# Patient Record
Sex: MALE | Race: WHITE | Employment: OTHER | URBAN - METROPOLITAN AREA
[De-identification: names, ages, dates, MRNs, and addresses within clinical notes are randomized per-mention and may not be internally consistent; named-entity substitution may affect disease eponyms.]

---

## 2018-02-23 ENCOUNTER — HOSPITAL ENCOUNTER (EMERGENCY)
Facility: CLINIC | Age: 63
Discharge: HOME | End: 2018-02-24
Attending: EMERGENCY MEDICINE | Admitting: EMERGENCY MEDICINE
Payer: COMMERCIAL

## 2018-02-23 DIAGNOSIS — M79.671 RIGHT FOOT PAIN: ICD-10-CM

## 2018-02-23 LAB
ALBUMIN SERPL-MCNC: 3.8 G/DL (ref 3.4–5)
ALP SERPL-CCNC: 54 U/L (ref 40–150)
ALT SERPL W P-5'-P-CCNC: 53 U/L (ref 0–70)
ANION GAP SERPL CALCULATED.3IONS-SCNC: 8 MMOL/L (ref 3–14)
AST SERPL W P-5'-P-CCNC: 29 U/L (ref 0–45)
BASOPHILS # BLD AUTO: 0 10E9/L (ref 0–0.2)
BASOPHILS NFR BLD AUTO: 0.2 %
BILIRUB SERPL-MCNC: 0.7 MG/DL (ref 0.2–1.3)
BUN SERPL-MCNC: 24 MG/DL (ref 7–30)
CALCIUM SERPL-MCNC: 8.8 MG/DL (ref 8.5–10.1)
CHLORIDE SERPL-SCNC: 106 MMOL/L (ref 94–109)
CO2 SERPL-SCNC: 24 MMOL/L (ref 20–32)
CREAT SERPL-MCNC: 1.21 MG/DL (ref 0.66–1.25)
DIFFERENTIAL METHOD BLD: ABNORMAL
EOSINOPHIL # BLD AUTO: 0.1 10E9/L (ref 0–0.7)
EOSINOPHIL NFR BLD AUTO: 0.7 %
ERYTHROCYTE [DISTWIDTH] IN BLOOD BY AUTOMATED COUNT: 13 % (ref 10–15)
GFR SERPL CREATININE-BSD FRML MDRD: 61 ML/MIN/1.7M2
GLUCOSE SERPL-MCNC: 117 MG/DL (ref 70–99)
HCT VFR BLD AUTO: 43.6 % (ref 40–53)
HGB BLD-MCNC: 15.1 G/DL (ref 13.3–17.7)
IMM GRANULOCYTES # BLD: 0 10E9/L (ref 0–0.4)
IMM GRANULOCYTES NFR BLD: 0.2 %
LYMPHOCYTES # BLD AUTO: 2.9 10E9/L (ref 0.8–5.3)
LYMPHOCYTES NFR BLD AUTO: 24.2 %
MCH RBC QN AUTO: 30.7 PG (ref 26.5–33)
MCHC RBC AUTO-ENTMCNC: 34.6 G/DL (ref 31.5–36.5)
MCV RBC AUTO: 89 FL (ref 78–100)
MONOCYTES # BLD AUTO: 1 10E9/L (ref 0–1.3)
MONOCYTES NFR BLD AUTO: 8.3 %
NEUTROPHILS # BLD AUTO: 8 10E9/L (ref 1.6–8.3)
NEUTROPHILS NFR BLD AUTO: 66.4 %
NRBC # BLD AUTO: 0 10*3/UL
NRBC BLD AUTO-RTO: 0 /100
PLATELET # BLD AUTO: 229 10E9/L (ref 150–450)
POTASSIUM SERPL-SCNC: 4 MMOL/L (ref 3.4–5.3)
PROT SERPL-MCNC: 7.5 G/DL (ref 6.8–8.8)
RBC # BLD AUTO: 4.92 10E12/L (ref 4.4–5.9)
SODIUM SERPL-SCNC: 138 MMOL/L (ref 133–144)
WBC # BLD AUTO: 12.1 10E9/L (ref 4–11)

## 2018-02-23 PROCEDURE — 20605 DRAIN/INJ JOINT/BURSA W/O US: CPT | Mod: RT

## 2018-02-23 PROCEDURE — 96374 THER/PROPH/DIAG INJ IV PUSH: CPT

## 2018-02-23 PROCEDURE — 80053 COMPREHEN METABOLIC PANEL: CPT | Performed by: EMERGENCY MEDICINE

## 2018-02-23 PROCEDURE — 85025 COMPLETE CBC W/AUTO DIFF WBC: CPT | Performed by: EMERGENCY MEDICINE

## 2018-02-23 PROCEDURE — 25000128 H RX IP 250 OP 636: Performed by: EMERGENCY MEDICINE

## 2018-02-23 PROCEDURE — 99285 EMERGENCY DEPT VISIT HI MDM: CPT | Mod: 25

## 2018-02-23 RX ORDER — HYDROMORPHONE HYDROCHLORIDE 1 MG/ML
0.5 INJECTION, SOLUTION INTRAMUSCULAR; INTRAVENOUS; SUBCUTANEOUS
Status: DISCONTINUED | OUTPATIENT
Start: 2018-02-23 | End: 2018-02-24 | Stop reason: HOSPADM

## 2018-02-23 RX ADMIN — HYDROMORPHONE HYDROCHLORIDE 0.5 MG: 1 INJECTION, SOLUTION INTRAMUSCULAR; INTRAVENOUS; SUBCUTANEOUS at 22:49

## 2018-02-23 NOTE — ED AVS SNAPSHOT
Emergency Department    [address] [place] [address]    Phone:  [phone]    Fax:  [phone]                                       [facility]   MRN: [id number]    Department:   Emergency Department   Date of Visit:  2/23/2018           After Visit Summary Signature Page     I have received my discharge instructions, and my questions have been answered. I have discussed any challenges I see with this plan with the nurse or doctor.    ..........................................................................................................................................  Patient/Patient Representative Signature      ..........................................................................................................................................  Patient Representative Print Name and Relationship to Patient    ..................................................               ................................................  Date                                            Time    ..........................................................................................................................................  Reviewed by Signature/Title    ...................................................              ..............................................  Date                                                            Time

## 2018-02-23 NOTE — LETTER
February 24, 2018      To Whom It May Concern:      Diony Murillo was seen in our Emergency Department today, 02/24/18.  I expect his condition to improve over the next *** days.  He may return to work/school when improved.    Sincerely,        Salvador Ignacio RN

## 2018-02-23 NOTE — ED AVS SNAPSHOT
Emergency Department    [address] [facility] [address]    Phone:  [phone]    Fax:  [phone]                                       Diony Murillo   MRN: [id number]    Department:   Emergency Department   Date of Visit:  2/23/2018           Patient Information     Date Of Birth          1955        Your diagnoses for this visit were:     Right foot pain        You were seen by Blue Portillo MD.      Follow-up Information     Follow up with Washington Ocasio MD. Call in 1 day.    Specialty:  Surgery    Contact information:    [facility]  [address] [facility] [place] [address]  [phone]          Follow up with  Emergency Department Today.    Specialty:  EMERGENCY MEDICINE    Why:  If symptoms worsen    Contact information:    [address] [facility] [id number] [phone]        Discharge Instructions           Acute Pain, Uncertain Cause  Pain can be caused by many conditions that range from very minor to very serious. In some cases, though, pain comes and goes with no apparent cause.  We were not able to find the exact cause for your pain. At this time there is no sign of any serious illness causing your pain. More tests may be needed to determine the cause. In many cases, pain like this goes away by itself.  Home care  Take any medicines as prescribed. If another medicine was not prescribed for pain, you can take an over-the-counter pain medicine such as ibuprofen or acetaminophen. Use these as directed on the label.    Follow-up care  Follow up with your healthcare provider or our staff as directed.  When to seek medical advice  Call your healthcare provider for any of the following:    Pain changes in pattern    Pain doesn't lessen or gets worse    New symptoms appear    Fever of 100.4 F (38 C) or higher, or as directed by your healthcare provider  Date Last Reviewed: 7/26/2015 2000-2017 [employer]. [address] [facility],  [place] [address]. All rights reserved. This information is not intended as a substitute for professional medical care. Always follow your healthcare professional's instructions.            24 Hour Appointment Hotline       To make an appointment at any [facility], call [phone] ([phone]). If you don't have a family doctor or clinic, we will help you find one. [facility] clinics are conveniently located to serve the needs of you and your family.             Review of your medicines      Our records show that you are taking the medicines listed below. If these are incorrect, please call your family doctor or clinic.        Dose / Directions Last dose taken    COLCHICINE PO   Dose:  0.6 mg        Take 0.6 mg by mouth   Refills:  0                Procedures and tests performed during your visit     CBC with platelets + differential    Cell count with differential fluid    Comprehensive metabolic panel    Crystal ID joint fluid    Fluid Culture Aerobic Bacterial    Glucose fluid    Gram stain    POC US GUIDANCE NEEDLE PLACEMENT    Protein fluid      Orders Needing Specimen Collection     None      Pending Results     Date and Time Order Name Status Description    2/24/2018 0007 POC US GUIDANCE NEEDLE PLACEMENT In process     2/23/2018 2231 Fluid Culture Aerobic Bacterial In process     2/23/2018 2231 Gram stain In process             Pending Culture Results     Date and Time Order Name Status Description    2/23/2018 2231 Fluid Culture Aerobic Bacterial In process     2/23/2018 2231 Gram stain In process             Pending Results Instructions     If you had any lab results that were not finalized at the time of your Discharge, you can call the ED Lab Result RN at [phone]. You will be contacted by this team for any positive Lab results or changes in treatment. The nurses are available 7 days a week from 10A to 6:30P.  You can leave a message 24 hours per day and they will return your call.         Test Results From Your Hospital Stay        2/23/2018 10:50 PM      Component Results     Component Value Ref Range & Units Status    WBC 12.1 (H) 4.0 - 11.0 10e9/L Final    RBC Count 4.92 4.4 - 5.9 10e12/L Final    Hemoglobin 15.1 13.3 - 17.7 g/dL Final    Hematocrit 43.6 40.0 - 53.0 % Final    MCV 89 78 - 100 fl Final    MCH 30.7 26.5 - 33.0 pg Final    MCHC 34.6 31.5 - 36.5 g/dL Final    RDW 13.0 10.0 - 15.0 % Final    Platelet Count 229 150 - 450 10e9/L Final    Diff Method Automated Method  Final    % Neutrophils 66.4 % Final    % Lymphocytes 24.2 % Final    % Monocytes 8.3 % Final    % Eosinophils 0.7 % Final    % Basophils 0.2 % Final    % Immature Granulocytes 0.2 % Final    Nucleated RBCs 0 0 /100 Final    Absolute Neutrophil 8.0 1.6 - 8.3 10e9/L Final    Absolute Lymphocytes 2.9 0.8 - 5.3 10e9/L Final    Absolute Monocytes 1.0 0.0 - 1.3 10e9/L Final    Absolute Eosinophils 0.1 0.0 - 0.7 10e9/L Final    Absolute Basophils 0.0 0.0 - 0.2 10e9/L Final    Abs Immature Granulocytes 0.0 0 - 0.4 10e9/L Final    Absolute Nucleated RBC 0.0  Final         2/23/2018 11:07 PM      Component Results     Component Value Ref Range & Units Status    Sodium 138 133 - 144 mmol/L Final    Potassium 4.0 3.4 - 5.3 mmol/L Final    Chloride 106 94 - 109 mmol/L Final    Carbon Dioxide 24 20 - 32 mmol/L Final    Anion Gap 8 3 - 14 mmol/L Final    Glucose 117 (H) 70 - 99 mg/dL Final    Urea Nitrogen 24 7 - 30 mg/dL Final    Creatinine 1.21 0.66 - 1.25 mg/dL Final    GFR Estimate 61 >60 mL/min/1.7m2 Final    Non  GFR Calc    GFR Estimate If Black 73 >60 mL/min/1.7m2 Final    African American GFR Calc    Calcium 8.8 8.5 - 10.1 mg/dL Final    Bilirubin Total 0.7 0.2 - 1.3 mg/dL Final    Albumin 3.8 3.4 - 5.0 g/dL Final    Protein Total 7.5 6.8 - 8.8 g/dL Final    Alkaline Phosphatase 54 40 - 150 U/L Final    ALT 53 0 - 70 U/L Final    AST 29 0 - 45 U/L Final         2/24/2018  1:56 AM      Component Results      Component Value Ref Range & Units Status    Crystal Analysis  NOCRYS^No clincally significant crystals seen. Final    No clincally significant crystals seen.          2/24/2018  1:44 AM      Component Results     Component Value Ref Range & Units Status    Body Fluid Analysis Source Right Ankle  Final    Color Fluid Yellow  Final    Appearance Fluid Slightly Cloudy  Final    WBC Fluid 32 /uL Final    No reference ranges have been established.  This result should be interpreted   in the context of the patient's clinical condition and compared to   simultaneous measurement in the patient's blood.  Refer to Lab Guide for   specific interpretive guidelines.           2/24/2018  1:58 AM      Component Results     Component Value Ref Range & Units Status    Glucose Fluid Source Right Ankle  Final    Glucose Fluid Canceled, Test credited mg/dL Final    Quantity not sufficient         2/24/2018  1:58 AM      Component Results     Component Value Ref Range & Units Status    Protein Total Fluid Source Right Ankle  Final    Protein Total Fluid Canceled, Test credited g/dL Final    Quantity not sufficient         2/24/2018 12:48 AM         2/24/2018 12:48 AM         2/24/2018 12:07 AM      Result not yet available     Exam Begun                Clinical Quality Measure: Blood Pressure Screening     Your blood pressure was checked while you were in the emergency department today. The last reading we obtained was  BP: 151/90 . Please read the guidelines below about what these numbers mean and what you should do about them.  If your systolic blood pressure (the top number) is less than 120 and your diastolic blood pressure (the bottom number) is less than 80, then your blood pressure is normal. There is nothing more that you need to do about it.  If your systolic blood pressure (the top number) is 120-139 or your diastolic blood pressure (the bottom number) is 80-89, your blood pressure may be higher than it should be. You should  "have your blood pressure rechecked within a year by a primary care provider.  If your systolic blood pressure (the top number) is 140 or greater or your diastolic blood pressure (the bottom number) is 90 or greater, you may have high blood pressure. High blood pressure is treatable, but if left untreated over time it can put you at risk for heart attack, stroke, or kidney failure. You should have your blood pressure rechecked by a primary care provider within the next 4 weeks.  If your provider in the emergency department today gave you specific instructions to follow-up with your doctor or provider even sooner than that, you should follow that instruction and not wait for up to 4 weeks for your follow-up visit.        Thank you for choosing [facility]       Thank you for choosing [facility] for your care. Our goal is always to provide you with excellent care. Hearing back from our patients is one way we can continue to improve our services. Please take a few minutes to complete the written survey that you may receive in the mail after you visit with us. Thank you!        [employer] Information     [employer] lets you send messages to your doctor, view your test results, renew your prescriptions, schedule appointments and more. To sign up, go to [email or web address] . Click on \"Log in\" on the left side of the screen, which will take you to the Welcome page. Then click on \"Sign up Now\" on the right side of the page.     You will be asked to enter the access code listed below, as well as some personal information. Please follow the directions to create your username and password.     Your access code is: [email or web address]  Expires: 2018  2:24 AM     Your access code will  in 90 days. If you need help or a new code, please call your [facility] clinic or [phone].        Care EveryWhere ID     This is your Care EveryWhere ID. This could be used by other organizations to access your [facility] medical " records  [id number]        Equal Access to Services     IRINA LAI : Christie Matson, boo hand, darvin mak. So [facility] [phone].    ATENCIÓN: Si habla español, tiene a trujillo disposición servicios gratuitos de asistencia lingüística. Llame al [phone].    We comply with applicable federal civil rights laws and [place] laws. We do not discriminate on the basis of race, color, national origin, age, disability, sex, sexual orientation, or gender identity.            After Visit Summary       This is your record. Keep this with you and show to your community pharmacist(s) and doctor(s) at your next visit.

## 2018-02-24 VITALS
BODY MASS INDEX: 27.98 KG/M2 | TEMPERATURE: 98.7 F | DIASTOLIC BLOOD PRESSURE: 74 MMHG | WEIGHT: 218 LBS | OXYGEN SATURATION: 97 % | RESPIRATION RATE: 16 BRPM | SYSTOLIC BLOOD PRESSURE: 127 MMHG | HEART RATE: 53 BPM | HEIGHT: 74 IN

## 2018-02-24 LAB
APPEARANCE FLD: NORMAL
BASOPHILS NFR FLD MANUAL: 1 %
COLOR FLD: YELLOW
CRYSTALS SNV MICRO: NORMAL
EOSINOPHIL NFR FLD MANUAL: 1 %
GLUCOSE FLD-MCNC: NORMAL MG/DL
GRAM STN SPEC: NORMAL
LYMPHOCYTES NFR FLD MANUAL: 26 %
MONOS+MACROS NFR FLD MANUAL: 62 %
NEUTS BAND NFR FLD MANUAL: 10 %
PROT FLD-MCNC: NORMAL G/DL
SPECIMEN SOURCE FLD: NORMAL
SPECIMEN SOURCE: NORMAL
WBC # FLD AUTO: 32 /UL

## 2018-02-24 PROCEDURE — 87076 CULTURE ANAEROBE IDENT EACH: CPT | Performed by: EMERGENCY MEDICINE

## 2018-02-24 PROCEDURE — 89051 BODY FLUID CELL COUNT: CPT | Performed by: EMERGENCY MEDICINE

## 2018-02-24 PROCEDURE — 87205 SMEAR GRAM STAIN: CPT | Performed by: EMERGENCY MEDICINE

## 2018-02-24 PROCEDURE — 89060 EXAM SYNOVIAL FLUID CRYSTALS: CPT | Performed by: EMERGENCY MEDICINE

## 2018-02-24 PROCEDURE — 87070 CULTURE OTHR SPECIMN AEROBIC: CPT | Performed by: EMERGENCY MEDICINE

## 2018-02-24 RX ORDER — OXYCODONE HYDROCHLORIDE 5 MG/1
5-10 TABLET ORAL EVERY 4 HOURS PRN
Qty: 15 TABLET | Refills: 0 | Status: SHIPPED | OUTPATIENT
Start: 2018-02-24 | End: 2019-02-06

## 2018-02-24 ASSESSMENT — ENCOUNTER SYMPTOMS
COLOR CHANGE: 1
JOINT SWELLING: 1
ARTHRALGIAS: 1

## 2018-02-24 NOTE — DISCHARGE INSTRUCTIONS
Acute Pain, Uncertain Cause  Pain can be caused by many conditions that range from very minor to very serious. In some cases, though, pain comes and goes with no apparent cause.  We were not able to find the exact cause for your pain. At this time there is no sign of any serious illness causing your pain. More tests may be needed to determine the cause. In many cases, pain like this goes away by itself.  Home care  Take any medicines as prescribed. If another medicine was not prescribed for pain, you can take an over-the-counter pain medicine such as ibuprofen or acetaminophen. Use these as directed on the label.    Follow-up care  Follow up with your healthcare provider or our staff as directed.  When to seek medical advice  Call your healthcare provider for any of the following:    Pain changes in pattern    Pain doesn't lessen or gets worse    New symptoms appear    Fever of 100.4 F (38 C) or higher, or as directed by your healthcare provider  Date Last Reviewed: 7/26/2015 2000-2017 [employer]. [address] [place] [address], [place] [address]. All rights reserved. This information is not intended as a substitute for professional medical care. Always follow your healthcare professional's instructions.

## 2018-02-24 NOTE — ED PROVIDER NOTES
"  History     Chief Complaint:  Ankle Pain       HPI   Diony Tarango is a 62 year old male who presents to the emergency department today for evaluation of ankle pain. Yesterday afternoon, the patient developed discomfort and swelling at the lateral aspect of his right ankle. His pain increased last night so he called and made an appointment to see his PCP at [facility]. Today, he had labs drawn and obtained imaging studies. He had negative XR but his Uric acid was elevated, see results below, which he notes has been an issue in the past. Patient was discharged to home with colchicine. Since his visit, he has had increased redness, pain, and warmth at the dorsal aspect of his foot as well as numbness in his toes prompting ED visit. Here, patient is concerned he could have gout. His pain is aggravated with ROM of his foot and palpation. No fever     Workup from clinic:  Right Foot XR, 3 Views:  IMPRESSION:  Soft tissue swelling along anterior aspect of the ankle and midfoot. No acute bony abnormality.    Uric Acid: 8.8(H)      Allergies:  No Known Drug Allergies     Medications:    Colchicine      Past Medical History:    History reviewed. No pertinent past medical history.    Past Surgical History:    History reviewed. No pertinent past surgical history.    Family History:    History reviewed. No pertinent family history.     Social History:  The patient was accompanied to the ED by self.    Review of Systems   Musculoskeletal: Positive for arthralgias and joint swelling.   Skin: Positive for color change.   All other systems reviewed and are negative.    Physical Exam   First Vitals:  BP: (!) 169/100  Heart Rate: 65  Temp: 98.7  F (37.1  C)  Height: 188 cm (6' 2\")  Weight: 98.9 kg (218 lb)  SpO2: 97 %    Physical Exam  General: Alert and Interactive.   Head: No signs of trauma.   Mouth/Throat: Oropharynx is clear and moist.   Eyes: Conjunctivae are normal. Pupils are equal, round, and reactive to light.   Neck: Normal " range of motion. No nuchal rigidity.   CV: Normal rate and regular rhythm.    Resp: Effort normal and breath sounds normal. No respiratory distress.   GI: Soft. There is no tenderness or guarding.   MSK: Normal range of motion. no edema.   Neuro: The patient is alert and oriented to person, place, and time.  PERRLA, EOMI, strength in upper/lower extremities normal and symmetrical.   Sensation normal. Speech normal.  GCS eye subscore is 4. GCS verbal subscore is 5. GCS motor subscore is 6.   Skin: Skin is warm and dry. No rash noted. Swelling and redness isolated to the right foot as seen in the picture below.  Psych: normal mood and affect. behavior is normal.                   Emergency Department Course   Laboratory:  Laboratory findings were communicated with the patient who voiced understanding of the findings.    CBC: WBC: 12.1(H) o/w WNL. (HGB 15.1, )     CMP: Glucose: 117(H) w/o WNL (Creatinine: 1.21)    Crystal ID Joint fluid: Negative     Cell count with differential fluid: Yellow slightly cloudy    Gram stain: Pending     Fluid Culture Aerobic Bacterial: Pending    Procedures:  PROCEDURE: Arthrocentesis     LOCATION: Right foot    FUNCTION:  Distally (sensation, circulation, motor, and tendon) function are intact.    ANESTHESIA: Local using 1% Lidocaine with Epi, 1 cc's    PREPARATION:  Betadine    PROCEDURE: Right medial medial approach was used. 2 mls of clear yellow appearing joint fluid were extracted without complication through a 19 gauge needle. This was sent to lab for evaluation, findings above.    Interventions:  2249- Dilaudid 0.5 mg IV      Emergency Department Course:  Nursing notes and vitals reviewed.  I performed an exam of the patient as documented above.     IV was inserted and blood was drawn for laboratory testing, results above.    0105- Bedside US showed effusion and arthrocentesis procedure performed.     I discussed the treatment plan with the patient. They expressed  understanding of this plan and consented to discharge.     Impression & Plan      Medical Decision Making:  Diony Murillo is a 62 year old male who presents to the emergency department complaining of right foot pain and swelling. He was seen in the primary clinic earlier in the day. XRs were negative and he was started on Colchicine  . He is coming to the ED tonight because his symptoms worsened. DDX lead by gout as well as septic joint.     The patient underwent joint aspiration. The fluid was clear yellow with no signs of infection. No crystal seen on analysis and cultures are pending. I see no evidence for systemic illness. Etiology of his symptoms is unclear. We will immobilize and have him closely follow up with orthopedics or return to the ED with worsening symptoms.       Diagnosis:    ICD-10-CM    1. Right foot pain M79.671 Cell count with differential fluid         Disposition:   Findings and plan explained to the Patient. Patient discharged home with instructions regarding supportive care, medications, and reasons to return. The importance of close follow-up was reviewed. The patient was prescribed Roxicodone.       Discharge Medications:  New Prescriptions    OXYCODONE IR (ROXICODONE) 5 MG TABLET    Take 1-2 tablets (5-10 mg) by mouth every 4 hours as needed for moderate to severe pain       Scribe Disclosure:  Bailey SALOMON, am serving as a scribe at 10:07 PM on 2/23/2018 to document services personally performed by Blue Portillo MD, based on my observations and the provider's statements to me.    2/23/2018    EMERGENCY DEPARTMENT       Blue Portillo MD  02/24/18 [id number]

## 2018-02-28 NOTE — ED NOTES
Lab called regarding positive cultures from the ankle aspirate. I spoke with patient regarding these findings and recommended returning for further evaluation for concern for possible septic joint.  Patient stated he was planning on returning to an [facility] as it is in network with his insurance.  He did understand the possible risks of not following up promptly.     Sanket Mcgee MD  02/28/18 1032

## 2018-03-06 ENCOUNTER — TELEPHONE (OUTPATIENT)
Dept: EMERGENCY MEDICINE | Facility: CLINIC | Age: 63
End: 2018-03-06

## 2018-03-06 LAB
BACTERIA SPEC CULT: ABNORMAL
SPECIMEN SOURCE: ABNORMAL

## 2018-03-06 NOTE — TELEPHONE ENCOUNTER
[facility] Emergency Department Lab result notification:    [place] ED lab result protocol used  General Culture Protocol - Fluid    Reason for call  Notify of lab results, assess symptoms,  review ED providers recommendations/discharge instructions (if necessary) and advise per ED lab result f/u protocol    Lab Result  Final FLUID culture report on 03/06/2018  [place] ED discharge antibiotic: none  Bacteria, On day 3, isolated in broth only: Propionibacterium (Cutibacterium) acnes and On day 3, isolated in broth only:Rhodococcus species.  Patient to be notified of result, symptoms's assessed and advised per [place] ED lab result protocol.  Information table from ED Provider visit on 02/23/2018  Symptoms reported at ED visit (Chief complaint, HPI) a 62 year old male who presents to the emergency department today for evaluation of ankle pain. Yesterday afternoon, the patient developed discomfort and swelling at the lateral aspect of his right ankle. His pain increased last night so he called and made an appointment to see his PCP at [facility]. Today, he had labs drawn and obtained imaging studies. He had negative XR but his Uric acid was elevated, see results below, which he notes has been an issue in the past. Patient was discharged to home with colchicine. Since his visit, he has had increased redness, pain, and warmth at the dorsal aspect of his foot as well as numbness in his toes prompting ED visit. Here, patient is concerned he could have gout. His pain is aggravated with ROM of his foot and palpation. No fever    ED providers Impression and Plan (applicable information) a 62 year old male who presents to the emergency department complaining of right foot pain and swelling. He was seen in the primary clinic earlier in the day. XRs were negative and he was started on Colchicine  . He is coming to the ED tonight because his symptoms worsened. DDX lead by gout as well as septic joint.      The patient  "underwent joint aspiration. The fluid was clear yellow with no signs of infection. No crystal seen on analysis and cultures are pending. I see no evidence for systemic illness. Etiology of his symptoms is unclear. We will immobilize and have him closely follow up with orthopedics or return to the ED with worsening symptoms.    Miscellaneous information \"Lab called regarding positive cultures from the ankle aspirate. I spoke with patient regarding these findings and recommended returning for further evaluation for concern for possible septic joint.  Patient stated he was planning on returning to an [facility] as it is in network with his insurance.  He did understand the possible risks of not following up promptly.\"     RN Assessment (Patient s current Symptoms), include time called.  [Insert Left message here if message left]  At 1510 I saw GP today, Wednesday, I am still not feeling good, My GP is awaiting the results (GP already requested earlier today) before treating.  I have appointment on Friday with Blue Mcbride at [address], can we fax to him.   RN Recommendations/Instructions per [place] ED lab result protocol  Keep follow up and We will fax the final result to Blue Gonzalez at [address]. Fax number [phone]    Please Contact your PCP clinic or return to the Emergency department if your:    Symptoms worsen or other concerning symptom's.    PCP follow-up Questions asked: YES       Lexie Hugo RN  [facility] RN  Lung Nodule and ED Lab Result F/u RN  Epic pool (ED late result f/u RN): [id number]  [facility] INCIDENTAL RADIOLOGY F/U NURSES: [id number]  # [phone]      Copy of Lab result   Exam Information   Exam Date Exam Time Accession # Results    2/24/18 12:45 AM [id number]    Component Results   Component Collected Lab   Specimen Description 02/24/2018 12:45    Right Ankle   Culture Micro (Abnormal) 02/24/2018 12:45    On day 3, " isolated in broth only:   Rhodococcus species   Susceptibility testing not routinely done      Culture Micro (Abnormal) 02/24/2018 12:45    On day 3, isolated in broth only:   Propionibacterium (Cutibacterium) acnes   Susceptibility testing of Propionibacterium species is not done from this source. Our   antibiogram indicates that Propionibacterum species is susceptible to penicillin and   cefotaxime and most are susceptible to clindamycin.   Tereza Cedeno M.D., Medical Director      Culture Micro 02/24/2018 12:45    Critical Value/Significant Value, preliminary result only, called to and read back by   Sanket PERES at 2815 2/28/18 [id number]

## 2019-02-06 ENCOUNTER — OFFICE VISIT (OUTPATIENT)
Dept: SLEEP MEDICINE | Facility: CLINIC | Age: 64
End: 2019-02-06
Payer: COMMERCIAL

## 2019-02-06 VITALS
RESPIRATION RATE: 16 BRPM | DIASTOLIC BLOOD PRESSURE: 83 MMHG | OXYGEN SATURATION: 95 % | HEIGHT: 72 IN | SYSTOLIC BLOOD PRESSURE: 128 MMHG | BODY MASS INDEX: 30.07 KG/M2 | WEIGHT: 222 LBS

## 2019-02-06 DIAGNOSIS — R53.83 FATIGUE, UNSPECIFIED TYPE: ICD-10-CM

## 2019-02-06 DIAGNOSIS — R06.81 WITNESSED APNEIC SPELLS: ICD-10-CM

## 2019-02-06 DIAGNOSIS — I10 ESSENTIAL HYPERTENSION: ICD-10-CM

## 2019-02-06 DIAGNOSIS — R29.818 SUSPECTED SLEEP APNEA: Primary | ICD-10-CM

## 2019-02-06 DIAGNOSIS — R06.83 SNORING: ICD-10-CM

## 2019-02-06 PROCEDURE — 99204 OFFICE O/P NEW MOD 45 MIN: CPT | Performed by: INTERNAL MEDICINE

## 2019-02-06 RX ORDER — LOSARTAN POTASSIUM 50 MG/1
TABLET ORAL
Refills: 3 | COMMUNITY
Start: 2018-11-11

## 2019-02-06 RX ORDER — ZOLPIDEM TARTRATE 10 MG/1
TABLET ORAL
COMMUNITY
Start: 2018-08-29

## 2019-02-06 RX ORDER — ATORVASTATIN CALCIUM 40 MG/1
80 TABLET, FILM COATED ORAL DAILY
Refills: 3 | COMMUNITY
Start: 2018-12-04

## 2019-02-06 RX ORDER — ALLOPURINOL 300 MG/1
300 TABLET ORAL
COMMUNITY
Start: 2018-05-04

## 2019-02-06 ASSESSMENT — MIFFLIN-ST. JEOR: SCORE: 1839.99

## 2019-02-06 NOTE — NURSING NOTE
Chief Complaint   Patient presents with     Sleep Problem     Lack of sleep, poor sleep, snoring        Initial /83   Resp 16   Ht 1.829 m (6')   Wt 100.7 kg (222 lb)   SpO2 95%   BMI 30.11 kg/m   Estimated body mass index is 30.11 kg/m  as calculated from the following:    Height as of this encounter: 1.829 m (6').    Weight as of this encounter: 100.7 kg (222 lb).    Medication Reconciliation: complete    Neck circumference: 16.5 inches / 42 centimeters.    ESS 6    Elaine Calix MA

## 2019-02-06 NOTE — PATIENT INSTRUCTIONS
Your BMI is Body mass index is 30.11 kg/m .  Weight management is a personal decision.  If you are interested in exploring weight loss strategies, the following discussion covers the approaches that may be successful. Body mass index (BMI) is one way to tell whether you are at a healthy weight, overweight, or obese. It measures your weight in relation to your height.  A BMI of 18.5 to 24.9 is in the healthy range. A person with a BMI of 25 to 29.9 is considered overweight, and someone with a BMI of 30 or greater is considered obese. More than two-thirds of American adults are considered overweight or obese.  Being overweight or obese increases the risk for further weight gain. Excess weight may lead to heart disease and diabetes.  Creating and following plans for healthy eating and physical activity may help you improve your health.  Weight control is part of healthy lifestyle and includes exercise, emotional health, and healthy eating habits. Careful eating habits lifelong are the mainstay of weight control. Though there are significant health benefits from weight loss, long-term weight loss with diet alone may be very difficult to achieve- studies show long-term success with dietary management in less than 10% of people. Attaining a healthy weight may be especially difficult to achieve in those with severe obesity. In some cases, medications, devices and surgical management might be considered.  What can you do?  If you are overweight or obese and are interested in methods for weight loss, you should discuss this with your provider.     Consider reducing daily calorie intake by 500 calories.     Keep a food journal.     Avoiding skipping meals, consider cutting portions instead.    Diet combined with exercise helps maintain muscle while optimizing fat loss. Strength training is particularly important for building and maintaining muscle mass. Exercise helps reduce stress, increase energy, and improves fitness.  Increasing exercise without diet control, however, may not burn enough calories to loose weight.       Start walking three days a week 10-20 minutes at a time    Work towards walking thirty minutes five days a week     Eventually, increase the speed of your walking for 1-2 minutes at time    In addition, we recommend that you review healthy lifestyles and methods for weight loss available through the National Institutes of Health patient information sites:  http://win.niddk.nih.gov/publications/index.htm    And look into health and wellness programs that may be available through your health insurance provider, employer, local community center, or [employer].    Weight management plan: Patient was referred to their PCP to discuss a diet and exercise plan.

## 2019-02-06 NOTE — PROGRESS NOTES
Sleep Consultation:    Date on this visit: 2/6/2019    Primary Physician: Bal, Antonio Rogers     Chief complaint: snoring, witnessed apneas     Presenting History:     Diony Murillo is a 63 years old male, with medical history significant for hypertension who presents to clinic today for evaluation of sleep apnea.     Patient was previously diagnosed with sleep apnea. PSG on 9/28/2004 at 234 lbs showed an apnea hypopnea index of 22.2 and RDI 27.9. H did not start CPAP treatment as he travelled frequently for work and decided he will not be able to comply with therapy. He concentrated on weight loss and after about 30 lbs weight loss, a repeat test apparently did not show sleep apnea.    Patient has a history of chronic insomnia. For more than 10 years, he has taken Zolpidem 5-10 mg at night. His main difficulty is with sleep maintenance. After about 3 hours of sleep, he wakes up to use the bathroom and has difficulty returning to sleep with an active mind. He denies any adverse effects with use of Zolpidem. He has not experienced any complex motor behaviors and denies any morning impairment.     iDony goes to sleep at 11:00 PM during the week. He wakes up at 6:00 AM with an alarm. He falls asleep in 30 minutes.  Diony denies difficulty falling asleep.  He wakes up 2-4 times a night for 30 minutes before falling back to sleep.  Diony wakes up to uncertain reasons.  On weekends, Diony goes to sleep at 11:00 PM.  He wakes up at 6:00 AM with an alarm. He falls asleep in 30 minutes.  Patient gets an average of 5-6 hours of sleep per night.     Diony does snore every night. Patient does have a regular bed partner. There is report of snoring and gasping.  He does have witnessed apneas. They frequently sleep separately.  Patient sleeps on his back and side. He has frequent morning dry mouth and morning headaches, denies no restless legs. Diony denies any bruxism, sleep walking, sleep talking, dream enactment, sleep paralysis,  cataplexy and hypnogogic/hypnopompic hallucinations.    Diony has difficulty breathing through his nose.      Patient's Theodore Sleepiness score 6/24 consistent with no daytime sleepiness.      Diony naps 1 times per week for 30 minutes, feels refreshed after naps. He takes some inadvertant naps.  He denies closing eyes, dozing and falling asleep while driving. Patient was counseled on the importance of driving while alert, to pull over if drowsy, or nap before getting into the vehicle if sleepy.      He uses 1 cups/day of coffee. Last caffeine intake is usually before noon.    Allergies:    No Known Allergies    Medications:    Current Outpatient Medications   Medication Sig Dispense Refill     COLCHICINE PO Take 0.6 mg by mouth       oxyCODONE IR (ROXICODONE) 5 MG tablet Take 1-2 tablets (5-10 mg) by mouth every 4 hours as needed for moderate to severe pain 15 tablet 0       Problem List:  There are no active problems to display for this patient.       Past Medical/Surgical History:    - Hypertension   - Hyperlipidemia   - Gout     Social History:  Social History     Socioeconomic History     Marital status:      Spouse name: Not on file     Number of children: Not on file     Years of education: Not on file     Highest education level: Not on file   Social Needs     Financial resource strain: Not on file     Food insecurity - worry: Not on file     Food insecurity - inability: Not on file     Transportation needs - medical: Not on file     Transportation needs - non-medical: Not on file   Occupational History     Not on file   Tobacco Use     Smoking status: Not on file   Substance and Sexual Activity     Alcohol use: Not on file     Drug use: Not on file     Sexual activity: Not on file   Other Topics Concern     Not on file   Social History Narrative     Not on file       Family History:    - No family history of sleep disorders.     Review of Systems:  A complete review of systems reviewed by me is  negative with the exeption of what has been mentioned in the history of present illness.  CONSTITUTIONAL: NEGATIVE for weight gain/loss, fever, chills, sweats or night sweats, drug allergies.  EYES: NEGATIVE for changes in vision, blind spots, double vision.  ENT: NEGATIVE for ear pain, sore throat, sinus pain, post-nasal drip, runny nose, bloody nose  CARDIAC: NEGATIVE for fast heartbeats or fluttering in chest, chest pain or pressure, breathlessness when lying flat, swollen legs or swollen feet.  NEUROLOGIC:  POSITIVE for  headaches  DERMATOLOGIC: NEGATIVE for rashes, new moles or change in mole(s)  PULMONARY: NEGATIVE SOB at rest, SOB with activity, dry cough, productive cough, coughing up blood, wheezing or whistling when breathing.    GASTROINTESTINAL: NEGATIVE for nausea or vomitting, loose or watery stools, fat or grease in stools, constipation, abdominal pain, bowel movements black in color or blood noted.  GENITOURINARY: NEGATIVE for pain during urination, blood in urine, urinating more frequently than usual, irregular menstrual periods.  MUSCULOSKELETAL: NEGATIVE for muscle pain, bone or joint pain, swollen joints.  ENDOCRINE: NEGATIVE for increased thirst or urination, diabetes.  LYMPHATIC: NEGATIVE for swollen lymph nodes, lumps or bumps in the breasts or nipple discharge.    Physical Examination:  Vitals: /83   Resp 16   Ht 1.829 m (6')   Wt 100.7 kg (222 lb)   SpO2 95%   BMI 30.11 kg/m    BMI= Body mass index is 30.11 kg/m .         Hurley Total Score 2/6/2019   Total score - Hurley 6            GENERAL APPEARANCE: healthy, alert and no distress  EYES: Eyes grossly normal to inspection, PERRL and conjunctivae and sclerae normal  HENT: nose and mouth without ulcers or lesions, oropharynx crowded, soft palate dependent and tongue base enlarged  NECK: no adenopathy, no asymmetry, masses, or scars and thyroid normal to palpation  RESP: lungs clear to auscultation - no rales, rhonchi or  wheezes  CV: regular rates and rhythm, normal S1 S2, no S3 or S4 and no murmur, click or rub  ABDOMEN: soft, nontender, without hepatosplenomegaly or masses and bowel sounds normal  MS: extremities normal- no gross deformities noted  NEURO: Normal strength and tone, mentation intact and speech normal  PSYCH: mentation appears normal and affect normal/bright  Mallampati Class: IV.  Tonsillar Stage: 1  hidden by pillars.    Impression/Plan:    1. Obstructive sleep apnea     - Patient was diagnosed with moderate sleep apnea in 2004 with an AHI of 22 at weight of 234 lbs. He didnot start treat,emte and apparently apneas was resolved with weight loss. Current weight is 22 lbs and he reports emergence of sleep disordered breathing symptoms. A reassessment of sleep apnea with an overnight sleep study is recommended for treatment planning.     Plan:     1. Split night PSG for reassessment of sleep apnea     2. Insomnia, psychophysiologic     - Patient is currently treating insomnia with Zolpidem without any history of adverse effects. He was counseled regarding existing literature on risk with long term use of hypnotics. We discussed increased risk of falls, zolpidem associated complex behaviors, risk for driving impairment and associated with cognitive impairment and other medical comorbidities.  He was counseled on CBT-I for insomnia. Patient plans to continue Zolpidem for now and will consider CBT-I in the future.       He will follow up with me in approximately two weeks after his sleep study has been competed to review the results and discuss plan of care.       Polysomnography reviewed.  Obstructive sleep apnea reviewed.  Complications of untreated sleep apnea were reviewed.    I spent a total of 45 minutes with patient with more than 50% in counseling     Dr. Levy Coles     CC: No ref. provider found

## 2019-02-25 ENCOUNTER — THERAPY VISIT (OUTPATIENT)
Dept: SLEEP MEDICINE | Facility: CLINIC | Age: 64
End: 2019-02-25
Payer: COMMERCIAL

## 2019-02-25 DIAGNOSIS — R29.818 SUSPECTED SLEEP APNEA: ICD-10-CM

## 2019-02-25 DIAGNOSIS — I10 ESSENTIAL HYPERTENSION: ICD-10-CM

## 2019-02-25 DIAGNOSIS — R06.81 WITNESSED APNEIC SPELLS: ICD-10-CM

## 2019-02-25 DIAGNOSIS — R06.83 SNORING: ICD-10-CM

## 2019-02-25 DIAGNOSIS — R53.83 FATIGUE, UNSPECIFIED TYPE: ICD-10-CM

## 2019-02-25 PROCEDURE — 95810 POLYSOM 6/> YRS 4/> PARAM: CPT | Performed by: INTERNAL MEDICINE

## 2019-03-04 LAB — SLPCOMP: NORMAL

## 2019-03-04 NOTE — PROCEDURES
SLEEP STUDY INTERPRETATION  DIAGNOSTIC POLYSOMNOGRAPHY REPORT      Patient: RACHAEL PIPER  YOB: 1955  Study Date: 2/25/2019  MRN: [id number]  Referring Provider: Self Referred  Ordering Provider: Levy Coles MD    Indications for Polysomnography: The patient is a 63 y old Male who is 6' and weighs 222.0 lbs. His BMI is 30.4, Beulah sleepiness scale 6.0 and neck circumference is 42.0 cm. Relevant medical history includes hypertension, hyperlipidemia and insomnia. A diagnostic polysomnogram was performed to evaluate for sleep apnea.    Polysomnogram Data: A full night polysomnogram recorded the standard physiologic parameters including EEG, EOG, EMG, ECG, nasal and oral airflow. Respiratory parameters of chest and abdominal movements were recorded with respiratory inductance plethysmography. Oxygen saturation was recorded by pulse oximetry. Hypopnea scoring rule used: 1B 4%.    Sleep Architecture: Sleep was fragmented.   The total recording time of the polysomnogram was 421.1 minutes. The total sleep time was 363.5 minutes. Sleep latency was normal at 17.5 minutes with the use of a sleep aid (Zolpidem 10 mg). REM latency was 76.0 minutes. Arousal index was increased at 16.7 arousals per hour. Sleep efficiency was normal at 86.3%. Wake after sleep onset was 35.5 minutes. The patient spent 7.6% of total sleep time in Stage N1, 67.5% in Stage N2, 8.7% in Stage N3, and 16.2% in REM. Time in REM supine was 0.5 minutes.    Respiration: Moderate obstructive sleep apnea.     Events ? The polysomnogram revealed a presence of 44 obstructive, 1 central, and 6 mixed apneas resulting in an apnea index of 8.4 events per hour. There were 63 obstructive hypopneas and - central hypopneas resulting in an obstructive hypopnea index of 10.4 and central hypopnea index of - events per hour. The combined apnea/hypopnea index was 18.8 events per hour (central apnea/hypopnea index was 0.2 events per hour). The REM AHI was 30.5  events per hour. The supine AHI was 23.1 events per hour. The RERA index was 3.8 events per hour.  The RDI was 22.6 events per hour.    Snoring - was reported as mild/moderate.    Respiratory rate and pattern - was notable for normal respiratory rate and pattern.    Sustained Sleep Associated Hypoventilation - Transcutaneous carbon dioxide monitoring was not used; however significant hypoventilation was not suggested by oximetry.    Sleep Associated Hypoxemia - (Greater than 5 minutes O2 sat at or below 88%) was not present. Baseline oxygen saturation was 93.5%. Lowest oxygen saturation was 82.5%. Time spent less than or equal to 88% was 0.4 minutes. Time spent less than or equal to 89% was 2.5 minutes.    Movement Activity: There was no significant movement abnormality.     Periodic Limb Activity - There were 23 PLMs during the entire study. The PLM index was 3.8 movements per hour. The PLM Arousal Index was - per hour.    REM EMG Activity - Excessive transient/sustained muscle activity was not present.    Nocturnal Behavior - Abnormal sleep related behaviors were not noted during/arising out of NREM / REM sleep.     Bruxism - None apparent.    Cardiac Summary: Sinus rhythm with frequent PVCs.   The average pulse rate was 48.0 bpm. The minimum pulse rate was 38.9 bpm while the maximum pulse rate was 71.1 bpm.  Arrhythmias were noted.    Assessment:     This sleep study shows a moderate degree of obstructive sleep apnea and associated sleep fragmentation.     Recommendations:    Depending on clinical indications, consider the following options for treatment of moderate sleep apnea. .    Based on the presence of moderate obstructive sleep apnea, treatment could be empirically initiated with Auto?titrating PAP therapy with a range of 5 to 15 cmH2O. Recommend clinical follow up with sleep management team.    Patient may be a candidate for dental appliance through referral to Sleep Dentistry for the treatment of  obstructive sleep apnea.    If devices are not acceptable or effective, patient may benefit from evaluation of possible surgical options. If he is interested, would recommend referral to specialized ENT-Sleep provider.    Weight management.    Diagnostic Codes:   Obstructive Sleep Apnea G47.33  Repetitive Intrusions Into Sleep F51.8    2/25/2019 [place] [facility] Sleep Study (222.0 lbs) - AHI 18.8, RDI 22.6, Supine AHI 23.1, REM AHI 30.5, Low O2 82.5%, Time Spent ?88% 0.4 minutes / Time Spent ?89% 2.5 minutes.     _____________________________________   Electronically Signed By: Levy Coles MD 03/04/2019

## 2019-03-13 ENCOUNTER — VIRTUAL VISIT (OUTPATIENT)
Dept: SLEEP MEDICINE | Facility: CLINIC | Age: 64
End: 2019-03-13
Payer: COMMERCIAL

## 2019-03-13 DIAGNOSIS — G47.33 OSA (OBSTRUCTIVE SLEEP APNEA): Primary | ICD-10-CM

## 2019-03-13 PROCEDURE — 99442 ZZC PHYSICIAN TELEPHONE EVALUATION 11-20 MIN: CPT | Performed by: INTERNAL MEDICINE

## 2019-03-13 NOTE — PROGRESS NOTES
Phone Sleep Study Follow-Up Visit:    Date on this visit: 3/13/2019    Diony Murillo comes in today for follow-up of his sleep study done on 2/25/2019 at the [facility] for possible sleep apnea.    Sleep latency 17.5 minutes with Ambien.  REM achieved.   REM latency 76 minutes.  Sleep efficiency 86%. Total sleep time 363.5 minutes.    Sleep architecture:  Stage 1, 7.6% (5%), stage 2, 67.5% (45-55%), stage 3, 8.7% (15-20%), stage REM, 16% (20-25%).  AHI was 18.8, with desaturations. RDI 22.6.  REM AHI 30.5, consistent with severe REM YURIY.  Supine AHI 23, consistent with moderate SUPINE YURIY.  Periodic Limb Movement Index 3.8/hour.       These findings were reviewed with patient.     Past medical/surgical history, family history, social history, medications and allergies were reviewed.      Problem List:  There are no active problems to display for this patient.       Impression/Plan:    1. Obstructive sleep apnea, moderate     - Patient was counseled regarding moderate sleep apnea, consequences of untreated disease and management. We discussed auto PAP and oral appliance and compared expected outcomes. Patient opts for CPAP therapy.     - We reviewed finding of PVCs. Patient is asymptomatic and will follow up with PCP to consider if further investigation is warranted.      Plan:     1. Start auto PAP therapy       He will follow up with me in about 7 week(s).     Twelve minutes spent with patient, all of which were spent counseling, consulting, coordinating plan of care.      Dr. Levy Coles     CC: [facility], [facility]

## 2019-03-25 ENCOUNTER — DOCUMENTATION ONLY (OUTPATIENT)
Dept: SLEEP MEDICINE | Facility: CLINIC | Age: 64
End: 2019-03-25
Payer: COMMERCIAL

## 2019-03-25 DIAGNOSIS — G47.33 OSA (OBSTRUCTIVE SLEEP APNEA): ICD-10-CM

## 2019-03-25 NOTE — PROGRESS NOTES
Patient was offered choice of vendor and chose [facility].  Patient Diony Murillo was set up at [place] on March 25, 2019. Patient received a Resmed AirSense 10 Auto. Pressures were set at 5-15 cm H2O.   Patient s ramp is 5 cm H2O for Auto and FLEX/EPR is EPR, 2.  Patient received a [employer] Mask name: [id number]  Full Face mask size Medium, heated tubing and heated humidifier.  Patient is enrolled in the STM Program and does not need to meet compliance. Patient has a follow up on tbd pt will call to schedule     Godwin Chavez

## 2019-03-28 ENCOUNTER — DOCUMENTATION ONLY (OUTPATIENT)
Dept: SLEEP MEDICINE | Facility: CLINIC | Age: 64
End: 2019-03-28
Payer: COMMERCIAL

## 2019-03-28 NOTE — PROGRESS NOTES
3 DAY STM VISIT    Diagnostic AHI: 18.8 PSG    Patient contacted for 3 day STM visit.  Message left for patient to return call.     Device type: Auto-CPAP  PAP settings from order::  CPAP min 5 cm  H20       CPAP max 15 cm  H20  Mask type:    Nasal Mask     Device settings from machine      Min CPAP 5.0            Max CPAP 15.0      Assessment: Nightly usage over four hours.  Action plan: Pt to have f/u 14 day [facility] visit.  Patient has a follow up visit scheduled:   no.

## 2019-04-09 ENCOUNTER — DOCUMENTATION ONLY (OUTPATIENT)
Dept: SLEEP MEDICINE | Facility: CLINIC | Age: 64
End: 2019-04-09

## 2019-04-09 DIAGNOSIS — G47.33 OSA (OBSTRUCTIVE SLEEP APNEA): ICD-10-CM

## 2019-04-09 NOTE — PROGRESS NOTES
14  DAY STM VISIT    Diagnostic AHI: 18.8 PSG    Subjective measures:   Pt states things are going well and has no issues or complaints.  Pt is benefiting from therapy.      Assessment: Pt meeting objective benchmarks.  Patient meeting subjective benchmarks.     Action plan: pt to have 30 day STM visit.      Device type: Auto-CPAP    PAP settings: CPAP min 5.0 cm  H20       CPAP max 15.0 cm  H20            95th% pressure 12.7 cm  H20     Mask type:  Nasal Mask    Objective measures: 14 day rolling measures      Compliance  100 %      Leak  16.4 lpm  last  upload      AHI 1.27   last  upload      Average number of minutes 462      Objective measure goal  Compliance   Goal >70%  Leak   Goal < 24 lpm  AHI  Goal < 5  Usage  Goal >240

## 2019-04-25 ENCOUNTER — DOCUMENTATION ONLY (OUTPATIENT)
Dept: SLEEP MEDICINE | Facility: CLINIC | Age: 64
End: 2019-04-25

## 2019-04-25 DIAGNOSIS — G47.33 OSA (OBSTRUCTIVE SLEEP APNEA): ICD-10-CM

## 2019-04-25 NOTE — PROGRESS NOTES
30 DAY [facility] VISIT    Diagnostic AHI: 18.8   PSG    Subjective measures:   [place] a call next week.     Assessment: Pt meeting objective benchmarks.     Action plan: call scheduled next week.   Patient has not scheduled a follow up visit with Dr. Coles  Device type: Auto-CPAP  PAP settings: CPAP min 5.0 cm  H20     CPAP max 15.0 cm  H20        95th% pressure 11.9 cm  H20   Mask type:  Nasal Mask  Objective measures: 14 day rolling measures      Compliance  85 %      Leak  8.49 lpm  last  upload      AHI 1.01   last  upload      Average number of minutes 444      Objective measure goal  Compliance   Goal >70%  Leak   Goal < 24 lpm  AHI  Goal < 5  Usage  Goal >240

## 2019-05-03 ENCOUNTER — DOCUMENTATION ONLY (OUTPATIENT)
Dept: SLEEP MEDICINE | Facility: CLINIC | Age: 64
End: 2019-05-03

## 2019-05-03 DIAGNOSIS — G47.33 OSA (OBSTRUCTIVE SLEEP APNEA): ICD-10-CM

## 2019-05-03 NOTE — PROGRESS NOTES
STM Recheck Visit     Subjective measures:   Pt states things are going well and has no issues or complaints.  Pt is benefiting from therapy.    Assessment: Pt meeting objective benchmarks.  Patient meeting subjective benchmarks.   Action plan: pt to have 6 month STM visit  Patient doesn't feel he needs a follow up visit at this time with  Dr. Coles but he if anything changes he will call.   Device type: Auto-CPAP  PAP settings: CPAP min 5 cm  H20     CPAP max 15 cm  H20    95th% pressure 11.9 cm  H20   Objective measures: 14 day rolling measures      Compliance  92 %      Leak  5.08 lpm  last  upload      AHI 0.64   last  upload      Average number of minutes 435    Diagnostic AHI: 18.8     Objective measure goal  Compliance   Goal >70%  Leak   Goal < 10%  AHI  Goal < 5  Usage  Goal >240

## 2019-05-13 ENCOUNTER — DOCUMENTATION ONLY (OUTPATIENT)
Dept: SLEEP MEDICINE | Facility: CLINIC | Age: 64
End: 2019-05-13

## 2019-05-13 DIAGNOSIS — G47.33 OSA (OBSTRUCTIVE SLEEP APNEA): ICD-10-CM

## 2019-05-13 NOTE — PROGRESS NOTES
Pt called the [facility] sleep lab looking for a new respironics dreamwear ffm medium cushion. Pt states the cushion does not seal as good as it did and pt would like to continue to use the same mask. Pt is eligible for a cushion. Pt request it be shipped. Respironics dreamwear ffm medium cushion sent via [employer].

## 2019-09-11 ENCOUNTER — DOCUMENTATION ONLY (OUTPATIENT)
Dept: SLEEP MEDICINE | Facility: CLINIC | Age: 64
End: 2019-09-11

## 2019-09-11 DIAGNOSIS — G47.33 OSA (OBSTRUCTIVE SLEEP APNEA): ICD-10-CM

## 2019-09-11 NOTE — PROGRESS NOTES
Pt called the [place] sleep lab and left a voice mail regarding mask options. Returned pt call and spoke with pt. Pt states he is currently using the respironics dreamwear ffm and likes it however he was wondering if occlusion of one side reduces treatment effectiveness. Pt was also wondering about cpap effectiveness in general, is it working? What is it doing?   I informed pt that his mask is designed to work with occlusion and one side will deliver full treatment pressure. I also let pt know that we can schedule a follow up with his sleep provider otherwise pt could come in for a mask fitting and get reeducated on cpap and go over download. I did let pt know that i cannot speak medically and further questions would be for provider. Pt understood and was agreeable. Pt scheduled a mask fitting for Friday September 13, 2019 at 10:30am.

## 2019-09-13 ENCOUNTER — DOCUMENTATION ONLY (OUTPATIENT)
Dept: SLEEP MEDICINE | Facility: CLINIC | Age: 64
End: 2019-09-13
Payer: COMMERCIAL

## 2019-09-13 DIAGNOSIS — G47.33 OSA (OBSTRUCTIVE SLEEP APNEA): ICD-10-CM

## 2019-09-13 NOTE — PROGRESS NOTES
Pt came into the [place] sleep lab regarding mask options and therapy effectiveness. Pt current mask is the respironics dreamwear ffm. Pt likes and is successful with the mask but pt is unsure of tubing occlusion when side sleeping. Pt and I went over a detailed download to talk about the data. I informed pt that i cannot speak medically or give any advice. Pt understood and was agreeable. Pt and I went through the report and noted the AHI is low around 1 or less events an hour, leak is low and, pt use is almost every night. Pt was happy to verify that therapy is doing what it is supposed to.  Pt tried on the resmed airfit f30 size med and small. The medium fit better but both sealed and fit.   Pt preferred the medium but was unsure between the dreamwear and the f30. Pt was given a demo f30 size small to try out in order to decide between the masks before next eligibility date of 09/24/2019.   Pt and I went over resupply and pt took a handout.

## 2019-09-23 ENCOUNTER — DOCUMENTATION ONLY (OUTPATIENT)
Dept: SLEEP MEDICINE | Facility: CLINIC | Age: 64
End: 2019-09-23

## 2019-09-23 DIAGNOSIS — G47.33 OSA (OBSTRUCTIVE SLEEP APNEA): ICD-10-CM

## 2019-09-23 NOTE — PROGRESS NOTES
6 month [facility]    STM Recheck Visit     Diagnostic AHI: 18.8  PSG    Data only recheck     Assessment: Pt meeting objective benchmarks.     Action plan:   pt to follow up per provider request       Device type: Auto-CPAP ()  PAP settings: CPAP min 5.0  cm  H20     CPAP max 15.0  cm  H20         95th% pressure 13 cm  H20   Objective measures: 14 day rolling measures      Compliance  100 %      Leak  6 lpm  last  upload      AHI 0.68   last  upload      Average number of minutes 427      Objective measure goal  Compliance   Goal >70%  Leak   Goal < 24 lpm  AHI  Goal < 5  Usage  Goal >240

## 2020-04-02 DIAGNOSIS — G47.33 OBSTRUCTIVE SLEEP APNEA (ADULT) (PEDIATRIC): Primary | ICD-10-CM

## 2020-07-27 VITALS — HEIGHT: 72 IN | WEIGHT: 215 LBS | BODY MASS INDEX: 29.12 KG/M2

## 2020-07-27 SDOH — HEALTH STABILITY: MENTAL HEALTH: HOW MANY STANDARD DRINKS CONTAINING ALCOHOL DO YOU HAVE ON A TYPICAL DAY?: 1 OR 2

## 2020-07-27 SDOH — HEALTH STABILITY: MENTAL HEALTH: HOW OFTEN DO YOU HAVE 6 OR MORE DRINKS ON ONE OCCASION?: LESS THAN MONTHLY

## 2020-07-27 SDOH — HEALTH STABILITY: MENTAL HEALTH: HOW OFTEN DO YOU HAVE A DRINK CONTAINING ALCOHOL?: 4 OR MORE TIMES A WEEK

## 2020-07-27 ASSESSMENT — MIFFLIN-ST. JEOR: SCORE: 1798.23

## 2020-07-27 NOTE — PROGRESS NOTES
"Diony Murillo is a 65 year old male who is being evaluated via a billable video visit.      The patient has been notified of following:     \"This video visit will be conducted via a call between you and your physician/provider. We have found that certain health care needs can be provided without the need for an in-person physical exam.  This service lets us provide the care you need with a video conversation.  If a prescription is necessary we can send it directly to your pharmacy.  If lab work is needed we can place an order for that and you can then stop by our lab to have the test done at a later time.    Video visits are billed at different rates depending on your insurance coverage.  Please reach out to your insurance provider with any questions.    If during the course of the call the physician/provider feels a video visit is not appropriate, you will not be charged for this service.\"    Patient has given verbal consent for Video visit? Yes  How would you like to obtain your AVS? MyChart  If you are dropped from the video visit, the video invite should be resent to: Send to e-mail at: [email or web address]  Will anyone else be joining your video visit? No        Video-Visit Details    Type of service:  Video Visit    Video Start Time: 8:20 AM  Video End Time: 8:33 AM    Originating Location (pt. Location): Home    Distant Location (provider location):  [facility]     Platform used for Video Visit: Lauren Coles MD      Obstructive Sleep Apnea - PAP Follow-Up Visit:    Chief Complaint   Patient presents with     CPAP Follow Up       Diony Murillo comes in today for follow-up of their moderate sleep apnea, managed with CPAP.     PSG on 2/25/2019 showed an AHI of 18.8 per hour.     Overall, he rates the experience with PAP as 10 (0 poor, 10 great). The mask is comfortable. The mask is not leaking.  He is not snoring with the mask on. He is not having gasp arousals.  He is not having " significant oral/nasal dryness. The pressure settings are comfortable.     He uses full-face mask.     He continues to take Zolpidem 10 mg on most nights.     Bedtime is typically 11 pm. Usually it takes about 10 minutes to fall asleep with the mask on. Wake time is typically 7 am.  Patient is using PAP therapy 7-8 hours per night. The patient is usually getting 8 hours of sleep per night.    He does feel rested in the morning.    Total score - [place]: 2 (7/24/2020  7:03 PM)    ResMed     Auto-PAP 5-15 cmH2O download:   100% days with >4 hours use.  Average use 7 hours 45 minutes per day.  95%ile Leak 1 L/min. CPAP 95% pressure 11.6cm. AHI 1.3    Reviewed by team: Tobacco  Allergies  Meds  Soc Hx      Reviewed by provider:        Problem List:  Patient Active Problem List    Diagnosis Date Noted     YURIY (obstructive sleep apnea) 03/13/2019     Priority: Medium     2/25/2019 [facility] Diagnostic Sleep Study (222.0 lbs) - AHI 18.8, RDI 22.6, Supine AHI 23.1, REM AHI 30.5, Low O2 82.5%, Time Spent ?88% 0.4 minutes / Time Spent ?89% 2.5 minutes.            Ht 1.829 m (6')   Wt 97.5 kg (215 lb)   BMI 29.16 kg/m      Impression/Plan:     Moderate sleep apnea.     -  Tolerating PAP well. Daytime symptoms are improved. Patient reports significant improvement in his sleep and daytime function.     - I reviewed data from his CPAP device which shows regular compliance and normal AHI.     - We reviewed current understanding of risk with long term use of hypnotics. Risk of falls, complex behaviors in sleep, driving impairment and association studies showing increased risk of morbidity were discussed.     Plan:     1. Continue auto PAP therapy     Diony Murillo will follow up in about 1 year(s).     Thirteen minutes spent with patient, all of which were spent face-to-face counseling, consulting, coordinating plan of care.      Levy Coles MD, MD    CC:  Clinic, [facility],

## 2020-07-28 ENCOUNTER — VIRTUAL VISIT (OUTPATIENT)
Dept: SLEEP MEDICINE | Facility: CLINIC | Age: 65
End: 2020-07-28
Payer: MEDICARE

## 2020-07-28 DIAGNOSIS — G47.33 OSA (OBSTRUCTIVE SLEEP APNEA): ICD-10-CM

## 2020-07-28 PROCEDURE — 99213 OFFICE O/P EST LOW 20 MIN: CPT | Mod: 95 | Performed by: INTERNAL MEDICINE

## 2020-07-28 NOTE — PATIENT INSTRUCTIONS
Your Body mass index is 29.16 kg/m .  Weight management is a personal decision.  If you are interested in exploring weight loss strategies, the following discussion covers the approaches that may be successful. Body mass index (BMI) is one way to tell whether you are at a healthy weight, overweight, or obese. It measures your weight in relation to your height.  A BMI of 18.5 to 24.9 is in the healthy range. A person with a BMI of 25 to 29.9 is considered overweight, and someone with a BMI of 30 or greater is considered obese. More than two-thirds of American adults are considered overweight or obese.  Being overweight or obese increases the risk for further weight gain. Excess weight may lead to heart disease and diabetes.  Creating and following plans for healthy eating and physical activity may help you improve your health.  Weight control is part of healthy lifestyle and includes exercise, emotional health, and healthy eating habits. Careful eating habits lifelong are the mainstay of weight control. Though there are significant health benefits from weight loss, long-term weight loss with diet alone may be very difficult to achieve- studies show long-term success with dietary management in less than 10% of people. Attaining a healthy weight may be especially difficult to achieve in those with severe obesity. In some cases, medications, devices and surgical management might be considered.  What can you do?  If you are overweight or obese and are interested in methods for weight loss, you should discuss this with your provider.     Consider reducing daily calorie intake by 500 calories.     Keep a food journal.     Avoiding skipping meals, consider cutting portions instead.    Diet combined with exercise helps maintain muscle while optimizing fat loss. Strength training is particularly important for building and maintaining muscle mass. Exercise helps reduce stress, increase energy, and improves fitness.  Increasing exercise without diet control, however, may not burn enough calories to loose weight.       Start walking three days a week 10-20 minutes at a time    Work towards walking thirty minutes five days a week     Eventually, increase the speed of your walking for 1-2 minutes at time    In addition, we recommend that you review healthy lifestyles and methods for weight loss available through the National Institutes of Health patient information sites:  http://win.niddk.nih.gov/publications/index.htm    And look into health and wellness programs that may be available through your health insurance provider, employer, local community center, or [employer].

## 2020-08-06 ENCOUNTER — DOCUMENTATION ONLY (OUTPATIENT)
Dept: SLEEP MEDICINE | Facility: CLINIC | Age: 65
End: 2020-08-06

## 2020-08-06 NOTE — PROGRESS NOTES
Faxed signed cmn, and copy of RX for cpap supplies to [facility] [phone].  Copy of CMN sent to HIM to scan into epic.

## 2020-11-22 ENCOUNTER — HEALTH MAINTENANCE LETTER (OUTPATIENT)
Age: 65
End: 2020-11-22

## 2021-09-19 ENCOUNTER — HEALTH MAINTENANCE LETTER (OUTPATIENT)
Age: 66
End: 2021-09-19

## 2021-10-12 ENCOUNTER — VIRTUAL VISIT (OUTPATIENT)
Dept: SLEEP MEDICINE | Facility: CLINIC | Age: 66
End: 2021-10-12
Payer: MEDICARE

## 2021-10-12 ENCOUNTER — TELEPHONE (OUTPATIENT)
Dept: SLEEP MEDICINE | Facility: CLINIC | Age: 66
End: 2021-10-12

## 2021-10-12 VITALS — HEIGHT: 72 IN | BODY MASS INDEX: 28.71 KG/M2 | WEIGHT: 212 LBS

## 2021-10-12 DIAGNOSIS — G47.33 OSA (OBSTRUCTIVE SLEEP APNEA): ICD-10-CM

## 2021-10-12 DIAGNOSIS — G47.33 OSA (OBSTRUCTIVE SLEEP APNEA): Primary | ICD-10-CM

## 2021-10-12 PROCEDURE — 99214 OFFICE O/P EST MOD 30 MIN: CPT | Mod: 95 | Performed by: INTERNAL MEDICINE

## 2021-10-12 RX ORDER — TAMSULOSIN HYDROCHLORIDE 0.4 MG/1
CAPSULE ORAL
COMMUNITY
Start: 2021-10-11

## 2021-10-12 ASSESSMENT — MIFFLIN-ST. JEOR: SCORE: 1779.63

## 2021-10-12 NOTE — PROGRESS NOTES
Diony Murillo is a 66 year old who is being evaluated via a billable video visit.      How would you like to obtain your AVS? MyChart  If the video visit is dropped, the invitation should be resent by: Send to e-mail at: [email or web address]  Will anyone else be joining your video visit? No    Does patient have any form of state insurance?Yes   Do you have wifi? Yes  Do you have a smart phone/device?Yes  Can you download an kamryn on your phone comfortably with out assistance including You Tube? Yes    If patient encounters technical issues they should call [phone] :[id number]}    Muriel Ansari CMA    Video-Visit Details    Video Start Time: 11:05 AM    Type of service:  Video Visit    Video End Time:11:20 AM    Originating Location (pt. Location): Home    Distant Location (provider location):  [email or web address]     Platform used for Video Visit: Lauren      Obstructive Sleep Apnea - PAP Follow-Up Visit:    Chief Complaint   Patient presents with     CPAP Follow Up       Diony Murillo comes in today for follow-up of their moderate sleep apnea, managed with CPAP.     PSG on 2/25/2019 at weight of 222 lbs showed an AHI of 18.8 per hour.     Overall, he rates the experience with PAP as 10 (0 poor, 10 great). The mask is comfortable. The mask is not leaking.  He is not snoring with the mask on. He is not having gasp arousals.  He is not having significant oral/nasal dryness. The pressure settings are comfortable.     He uses full-face mask.     Bedtime is typically 11 PM. Usually it takes about 15 minutes to fall asleep with the mask on. Wake time is typically 6-7  am.  Patient is using PAP therapy 7-8 hours per night. The patient is usually getting 7-8 hours of sleep per night.    He does feel rested in the morning.    Total score - [place]: 0 (10/12/2021  8:28 AM)    ResMed     Auto-PAP 5-15 cmH2O download:  30/30 total days of use. 0 nonuse days. 100% days with >4 hours use.  Average use 7 hours 40 minutes per  day. Median Leak 0.2 L/min. 95%ile Leak 1.6 L/min. CPAP 95% pressure 12.4cm. AHI 0.9    Reviewed by team: Tobacco  Allergies  Meds            Reviewed by provider:                Problem List:  Patient Active Problem List    Diagnosis Date Noted     YURIY (obstructive sleep apnea) 03/13/2019     Priority: Medium     2/25/2019 [facility] Sleep Study (222.0 lbs) - AHI 18.8, RDI 22.6, Supine AHI 23.1, REM AHI 30.5, Low O2 82.5%, Time Spent ?88% 0.4 minutes / Time Spent ?89% 2.5 minutes.            Ht 1.829 m (6')   Wt 96.2 kg (212 lb)   BMI 28.75 kg/m      Impression/Plan:     Moderate sleep apnea.     -  Tolerating PAP well. Daytime symptoms are improved..     - I reviewed data from his CPAP which shows regular compliance and normal AHI.     -Patient travels to [place] often and at the high altitude, his device ramps up to a higher pressure which disrupts his sleep.  He wants to lower the max pressure to see if this will make a difference.  With a high-altitude, it is likely that he will need a higher CPAP pressure.  However I agreed that we could try lowering the max EPAP to see if this makes a difference in review progress through downloads.    Plan:     - Continue auto PAP therapy. Pressure change to 5-12 cm H2O for patient comfort     Diony Lidia will follow up in about 1 year(s).     I spent a total of 30 minutes for this appointment today which include time spent before, during and after the visit for patient care, counseling and coordination of care.      Levy Coles MD    CC:  Clinic, [facility],

## 2021-10-12 NOTE — PATIENT INSTRUCTIONS
Your BMI is Body mass index is 28.75 kg/m .  Weight management is a personal decision.  If you are interested in exploring weight loss strategies, the following discussion covers the approaches that may be successful. Body mass index (BMI) is one way to tell whether you are at a healthy weight, overweight, or obese. It measures your weight in relation to your height.  A BMI of 18.5 to 24.9 is in the healthy range. A person with a BMI of 25 to 29.9 is considered overweight, and someone with a BMI of 30 or greater is considered obese. More than two-thirds of American adults are considered overweight or obese.  Being overweight or obese increases the risk for further weight gain. Excess weight may lead to heart disease and diabetes.  Creating and following plans for healthy eating and physical activity may help you improve your health.  Weight control is part of healthy lifestyle and includes exercise, emotional health, and healthy eating habits. Careful eating habits lifelong are the mainstay of weight control. Though there are significant health benefits from weight loss, long-term weight loss with diet alone may be very difficult to achieve- studies show long-term success with dietary management in less than 10% of people. Attaining a healthy weight may be especially difficult to achieve in those with severe obesity. In some cases, medications, devices and surgical management might be considered.  What can you do?  If you are overweight or obese and are interested in methods for weight loss, you should discuss this with your provider.     Consider reducing daily calorie intake by 500 calories.     Keep a food journal.     Avoiding skipping meals, consider cutting portions instead.    Diet combined with exercise helps maintain muscle while optimizing fat loss. Strength training is particularly important for building and maintaining muscle mass. Exercise helps reduce stress, increase energy, and improves fitness.  Increasing exercise without diet control, however, may not burn enough calories to loose weight.       Start walking three days a week 10-20 minutes at a time    Work towards walking thirty minutes five days a week     Eventually, increase the speed of your walking for 1-2 minutes at time    In addition, we recommend that you review healthy lifestyles and methods for weight loss available through the National Institutes of Health patient information sites:  http://win.niddk.nih.gov/publications/index.htm    And look into health and wellness programs that may be available through your health insurance provider, employer, local community center, or [employer].            Your Body mass index is 28.75 kg/m .  Weight management is a personal decision.  If you are interested in exploring weight loss strategies, the following discussion covers the approaches that may be successful. Body mass index (BMI) is one way to tell whether you are at a healthy weight, overweight, or obese. It measures your weight in relation to your height.  A BMI of 18.5 to 24.9 is in the healthy range. A person with a BMI of 25 to 29.9 is considered overweight, and someone with a BMI of 30 or greater is considered obese. More than two-thirds of American adults are considered overweight or obese.  Being overweight or obese increases the risk for further weight gain. Excess weight may lead to heart disease and diabetes.  Creating and following plans for healthy eating and physical activity may help you improve your health.  Weight control is part of healthy lifestyle and includes exercise, emotional health, and healthy eating habits. Careful eating habits lifelong are the mainstay of weight control. Though there are significant health benefits from weight loss, long-term weight loss with diet alone may be very difficult to achieve- studies show long-term success with dietary management in less than 10% of people. Attaining a healthy weight may be  especially difficult to achieve in those with severe obesity. In some cases, medications, devices and surgical management might be considered.  What can you do?  If you are overweight or obese and are interested in methods for weight loss, you should discuss this with your provider.     Consider reducing daily calorie intake by 500 calories.     Keep a food journal.     Avoiding skipping meals, consider cutting portions instead.    Diet combined with exercise helps maintain muscle while optimizing fat loss. Strength training is particularly important for building and maintaining muscle mass. Exercise helps reduce stress, increase energy, and improves fitness. Increasing exercise without diet control, however, may not burn enough calories to loose weight.       Start walking three days a week 10-20 minutes at a time    Work towards walking thirty minutes five days a week     Eventually, increase the speed of your walking for 1-2 minutes at time    In addition, we recommend that you review healthy lifestyles and methods for weight loss available through the National Institutes of Health patient information sites:  http://win.niddk.nih.gov/publications/index.htm    And look into health and wellness programs that may be available through your health insurance provider, employer, local community center, or [employer].    {Weight Management Plan -- Delete if patient has a normal BMI:062505}

## 2021-10-28 NOTE — NURSING NOTE
1 year appointment reminder card created and will be mailed when appropriate. Muriel Ansari, [facility]

## 2022-01-09 ENCOUNTER — HEALTH MAINTENANCE LETTER (OUTPATIENT)
Age: 67
End: 2022-01-09

## 2022-04-26 ENCOUNTER — DOCUMENTATION ONLY (OUTPATIENT)
Dept: SLEEP MEDICINE | Facility: CLINIC | Age: 67
End: 2022-04-26
Payer: MEDICARE

## 2022-10-31 DIAGNOSIS — G47.33 OSA (OBSTRUCTIVE SLEEP APNEA): Primary | ICD-10-CM

## 2022-11-21 ENCOUNTER — HEALTH MAINTENANCE LETTER (OUTPATIENT)
Age: 67
End: 2022-11-21

## 2022-12-06 ENCOUNTER — OFFICE VISIT (OUTPATIENT)
Dept: SLEEP MEDICINE | Facility: CLINIC | Age: 67
End: 2022-12-06
Payer: MEDICARE

## 2022-12-06 VITALS
SYSTOLIC BLOOD PRESSURE: 105 MMHG | DIASTOLIC BLOOD PRESSURE: 69 MMHG | HEIGHT: 72 IN | OXYGEN SATURATION: 97 % | BODY MASS INDEX: 28.31 KG/M2 | HEART RATE: 55 BPM | WEIGHT: 209 LBS

## 2022-12-06 DIAGNOSIS — G47.33 OSA (OBSTRUCTIVE SLEEP APNEA): Primary | ICD-10-CM

## 2022-12-06 PROCEDURE — 99213 OFFICE O/P EST LOW 20 MIN: CPT | Performed by: INTERNAL MEDICINE

## 2022-12-06 NOTE — NURSING NOTE
Chief Complaint   Patient presents with     CPAP Follow Up       Initial /69   Pulse 55   Ht 1.829 m (6')   Wt 94.8 kg (209 lb)   SpO2 97%   BMI 28.35 kg/m   Estimated body mass index is 28.35 kg/m  as calculated from the following:    Height as of this encounter: 1.829 m (6').    Weight as of this encounter: 94.8 kg (209 lb).    Medication Reconciliation: complete  ESS 1  Letty Oneal MA

## 2022-12-06 NOTE — PROGRESS NOTES
Obstructive Sleep Apnea - PAP Follow-Up Visit:    Chief Complaint   Patient presents with     CPAP Follow Up       Diony [place] comes in today for follow-up of their moderate sleep apnea, managed with CPAP.     2/25/2019 [facility] Sleep Study (222.0 lbs) - AHI 18.8, RDI 22.6, Supine AHI 23.1, REM AHI 30.5, Low O2 82.5%, Time Spent ?88% 0.4 minutes / Time Spent ?89% 2.5 minutes.    EPWORTH SLEEPINESS SCALE      [place] Sleepiness Scale ( VIPUL Call  [id number]<br>ESS - [place]/English - Final version - 21 Nov 07 - [facility] [place].) 10/12/2021   Sitting and reading -   Watching TV -   Sitting, inactive in a public place (e.g. a theatre or a meeting) -   As a passenger in a car for an hour without a break -   Lying down to rest in the afternoon when circumstances permit -   Sitting and talking to someone -   Sitting quietly after a lunch without alcohol -   In a car, while stopped for a few minutes in traffic -   [place] Score (MC) -   [place] Score (Sleep) 0       INSOMNIA SEVERITY INDEX (BREA)      Insomnia Severity Index (BREA) 10/12/2021   Difficulty falling asleep 0   Difficulty staying asleep 0   Problems waking up too early 0   How SATISFIED/DISSATISFIED are you with your CURRENT sleep pattern? 0   How NOTICEABLE to others do you think your sleep problem is in terms of impairing the quality of your life? 0   How WORRIED/DISTRESSED are you about your current sleep problem? 0   To what extent do you consider your sleep problem to INTERFERE with your daily functioning (e.g. daytime fatigue, mood, ability to function at work/daily chores, concentration, memory, mood, etc.) CURRENTLY? 0   BREA Total Score 0       Guidelines for Scoring/Interpretation:  Total score categories:  0-7 = No clinically significant insomnia   8-14 = Subthreshold insomnia   15-21 = Clinical insomnia (moderate severity)  22-28 = Clinical insomnia (severe)  Used via courtesy of www.[employer].va.gov with permission from Rasta HOLLINS  Isaias PhD., [facility]    ResMed     Auto-PAP 5.0 - 12.0 cmH2O 30 day usage data:    100% of days with > 4 hours of use. 0/30 days with no use.   Average use 411 minutes per day.   95%ile Leak 8.05 L/min.   CPAP 95% pressure 10.9 cm.   AHI 0.92 events per hour.     Problem List:  Patient Active Problem List    Diagnosis Date Noted     YURIY (obstructive sleep apnea) 03/13/2019     Priority: Medium     2/25/2019 [place] [facility] Sleep Study (222.0 lbs) - AHI 18.8, RDI 22.6, Supine AHI 23.1, REM AHI 30.5, Low O2 82.5%, Time Spent ?88% 0.4 minutes / Time Spent ?89% 2.5 minutes.            /69   Pulse 55   Ht 1.829 m (6')   Wt 94.8 kg (209 lb)   SpO2 97%   BMI 28.35 kg/m      Impression/Plan:     Moderate obstructive sleep apnea.     -Patient is using CPAP regularly and benefiting from treatment.  He maintains regular sleep-wake schedules and has adequate nocturnal sleep.    -Review of download data from his device shows regular compliance and normal residual AHI, confirming adequate treatment of sleep apnea.    -Patient takes zolpidem 5 mg at bedtime fairly regularly.  He benefits from zolpidem, and denies any adverse effects.  There is no history of complex nocturnal behaviors.  We had a conversation regarding possible adverse effects with chronic use of hypnotics.  Possible adverse effects regarding sleepwalking, complex nocturnal behaviors, risk of falls and driving impairment was reviewed.  He plans to continue therapy and will monitor for any adverse events.    Plan:     1.  Continue auto titrating CPAP therapy with a pressure range of 5 to 12 cm H2O.    Diony [place] will follow up in about 1 year(s).     Levy Coles MD    CC:  Clinic, [facility],

## 2022-12-06 NOTE — PATIENT INSTRUCTIONS
Your Body mass index is 28.35 kg/m .  Weight management is a personal decision.  If you are interested in exploring weight loss strategies, the following discussion covers the approaches that may be successful. Body mass index (BMI) is one way to tell whether you are at a healthy weight, overweight, or obese. It measures your weight in relation to your height.  A BMI of 18.5 to 24.9 is in the healthy range. A person with a BMI of 25 to 29.9 is considered overweight, and someone with a BMI of 30 or greater is considered obese. More than two-thirds of American adults are considered overweight or obese.  Being overweight or obese increases the risk for further weight gain. Excess weight may lead to heart disease and diabetes.  Creating and following plans for healthy eating and physical activity may help you improve your health.  Weight control is part of healthy lifestyle and includes exercise, emotional health, and healthy eating habits. Careful eating habits lifelong are the mainstay of weight control. Though there are significant health benefits from weight loss, long-term weight loss with diet alone may be very difficult to achieve- studies show long-term success with dietary management in less than 10% of people. Attaining a healthy weight may be especially difficult to achieve in those with severe obesity. In some cases, medications, devices and surgical management might be considered.  What can you do?  If you are overweight or obese and are interested in methods for weight loss, you should discuss this with your provider.     Consider reducing daily calorie intake by 500 calories.     Keep a food journal.     Avoiding skipping meals, consider cutting portions instead.    Diet combined with exercise helps maintain muscle while optimizing fat loss. Strength training is particularly important for building and maintaining muscle mass. Exercise helps reduce stress, increase energy, and improves fitness.  Increasing exercise without diet control, however, may not burn enough calories to loose weight.       Start walking three days a week 10-20 minutes at a time    Work towards walking thirty minutes five days a week     Eventually, increase the speed of your walking for 1-2 minutes at time    In addition, we recommend that you review healthy lifestyles and methods for weight loss available through the National Institutes of Health patient information sites:  http://win.niddk.nih.gov/publications/index.htm    And look into health and wellness programs that may be available through your health insurance provider, employer, local community center, or [employer].

## 2023-04-16 ENCOUNTER — HEALTH MAINTENANCE LETTER (OUTPATIENT)
Age: 68
End: 2023-04-16

## 2023-05-03 ENCOUNTER — TELEPHONE (OUTPATIENT)
Dept: SLEEP MEDICINE | Facility: CLINIC | Age: 68
End: 2023-05-03
Payer: MEDICARE

## 2023-05-03 ENCOUNTER — MYC MEDICAL ADVICE (OUTPATIENT)
Dept: SLEEP MEDICINE | Facility: CLINIC | Age: 68
End: 2023-05-03
Payer: MEDICARE

## 2023-05-03 DIAGNOSIS — G47.33 OSA (OBSTRUCTIVE SLEEP APNEA): Primary | ICD-10-CM

## 2023-05-05 NOTE — TELEPHONE ENCOUNTER
New order faxed to [phone]    Leonora STRONG RN  [facility]       [phone]

## 2023-05-05 NOTE — TELEPHONE ENCOUNTER
Patient seen 12/6/22    Order pended for provider review.     Leonora STRONG RN  [facility]

## 2024-06-03 ENCOUNTER — DOCUMENTATION ONLY (OUTPATIENT)
Dept: SLEEP MEDICINE | Facility: CLINIC | Age: 69
End: 2024-06-03
Payer: MEDICARE

## 2024-06-03 DIAGNOSIS — G47.33 OBSTRUCTIVE SLEEP APNEA (ADULT) (PEDIATRIC): Primary | ICD-10-CM

## 2024-06-22 ENCOUNTER — HEALTH MAINTENANCE LETTER (OUTPATIENT)
Age: 69
End: 2024-06-22

## 2025-03-02 ENCOUNTER — HEALTH MAINTENANCE LETTER (OUTPATIENT)
Age: 70
End: 2025-03-02

## 2025-06-01 ASSESSMENT — SLEEP AND FATIGUE QUESTIONNAIRES
HOW LIKELY ARE YOU TO NOD OFF OR FALL ASLEEP IN A CAR, WHILE STOPPED FOR A FEW MINUTES IN TRAFFIC: WOULD NEVER DOZE
HOW LIKELY ARE YOU TO NOD OFF OR FALL ASLEEP WHILE WATCHING TV: WOULD NEVER DOZE
HOW LIKELY ARE YOU TO NOD OFF OR FALL ASLEEP WHILE SITTING QUIETLY AFTER LUNCH WITHOUT ALCOHOL: WOULD NEVER DOZE
HOW LIKELY ARE YOU TO NOD OFF OR FALL ASLEEP WHILE SITTING AND READING: WOULD NEVER DOZE
HOW LIKELY ARE YOU TO NOD OFF OR FALL ASLEEP WHILE SITTING AND TALKING TO SOMEONE: WOULD NEVER DOZE
HOW LIKELY ARE YOU TO NOD OFF OR FALL ASLEEP WHILE SITTING INACTIVE IN A PUBLIC PLACE: WOULD NEVER DOZE
HOW LIKELY ARE YOU TO NOD OFF OR FALL ASLEEP WHILE LYING DOWN TO REST IN THE AFTERNOON WHEN CIRCUMSTANCES PERMIT: MODERATE CHANCE OF DOZING
HOW LIKELY ARE YOU TO NOD OFF OR FALL ASLEEP WHEN YOU ARE A PASSENGER IN A CAR FOR AN HOUR WITHOUT A BREAK: WOULD NEVER DOZE

## 2025-06-04 ENCOUNTER — OFFICE VISIT (OUTPATIENT)
Dept: SLEEP MEDICINE | Facility: CLINIC | Age: 70
End: 2025-06-04
Payer: MEDICARE

## 2025-06-04 VITALS
OXYGEN SATURATION: 97 % | WEIGHT: 210 LBS | DIASTOLIC BLOOD PRESSURE: 69 MMHG | SYSTOLIC BLOOD PRESSURE: 119 MMHG | BODY MASS INDEX: 28.44 KG/M2 | HEART RATE: 47 BPM | HEIGHT: 72 IN

## 2025-06-04 DIAGNOSIS — G47.33 OSA (OBSTRUCTIVE SLEEP APNEA): Primary | ICD-10-CM

## 2025-06-04 PROCEDURE — 99213 OFFICE O/P EST LOW 20 MIN: CPT | Performed by: INTERNAL MEDICINE

## 2025-06-04 PROCEDURE — 3078F DIAST BP <80 MM HG: CPT | Performed by: INTERNAL MEDICINE

## 2025-06-04 PROCEDURE — 3074F SYST BP LT 130 MM HG: CPT | Performed by: INTERNAL MEDICINE

## 2025-06-04 RX ORDER — ROSUVASTATIN CALCIUM 40 MG/1
TABLET, COATED ORAL
COMMUNITY

## 2025-06-04 NOTE — PROGRESS NOTES
Obstructive Sleep Apnea - PAP Follow-Up Visit:    Chief Complaint   Patient presents with    CPAP Follow Up       Diony [place] comes in today for follow-up of their moderate sleep apnea, managed with CPAP.     2/25/2019 [place] [facility] Sleep Study (222.0 lbs) - AHI 18.8, RDI 22.6, Supine AHI 23.1, REM AHI 30.5, Low O2 82.5%, Time Spent <=88% 0.4 minutes / Time Spent <=89% 2.5 minutes.     Do you use a CPAP Machine at home: (Patient-Rptd) Yes  Overall, on a scale of 0-10 how would you rate your CPAP (0 poor, 10 great): (Patient-Rptd) 9  Is your mask comfortable: (Patient-Rptd) Yes  If not, why:    Is you mask leaking: (Patient-Rptd) No  If yes, where do you feel it:    How many night per week does the mask leak (0-7):    Do you notice snoring with mask on: (Patient-Rptd) No  Do you notice gasping arousals with mask on: (Patient-Rptd) No  Are you having significant oral or nasal dryness: (Patient-Rptd) No  Is the pressure setting comfortable: (Patient-Rptd) No  In not, why: (Patient-Rptd) When it's on high (say 8-9) is when I get woken up in the night. I'd prefer a slightly lower high setting.  What type of mask do you use: (Patient-Rptd) Full Face Mask  What is your typical bedtime: (Patient-Rptd) 11pm  How long does it take you to go to sleep on PAP therapy: (Patient-Rptd) 15 minutes  What time do you typically get out of bed for the day: (Patient-Rptd) 6am  How many hours on average per night are you using PAP therapy: (Patient-Rptd) 5-6 hours.  How many hours are you sleeping per night: (Patient-Rptd) 5-6 hours.  Do you feel well rested in the morning: (Patient-Rptd) Yes    ResMed     Auto-PAP 5.0 - 12.0 cmH2O 30 day usage data:    96% of days with > 4 hours of use. 0/30 days with no use.   Average use 358 minutes per day.   95%ile Leak 6.43 L/min.   CPAP 95% pressure 9.2 cm.   AHI 0.69 events per hour.     EPWORTH SLEEPINESS SCALE         6/1/2025     6:43 AM    [place] Sleepiness Scale ( [employer]   [id number]<br>ESS - [place]/English - Final version - 21 Nov 07 - [facility] [place].)   Sitting and reading Would never doze   Watching TV Would never doze   Sitting, inactive in a public place (e.g. a theatre or a meeting) Would never doze   As a passenger in a car for an hour without a break Would never doze   Lying down to rest in the afternoon when circumstances permit Moderate chance of dozing   Sitting and talking to someone Would never doze   Sitting quietly after a lunch without alcohol Would never doze   In a car, while stopped for a few minutes in traffic Would never doze   [place] Score (MC) 2   Chambers Score (Sleep) 2        Patient-reported         INSOMNIA SEVERITY INDEX (BREA)          6/1/2025     6:39 AM   Insomnia Severity Index (BREA)   Difficulty falling asleep 0   Difficulty staying asleep 1   Problems waking up too early 1   How SATISFIED/DISSATISFIED are you with your CURRENT sleep pattern? 1   How NOTICEABLE to others do you think your sleep problem is in terms of impairing the quality of your life? 0   How WORRIED/DISTRESSED are you about your current sleep problem? 0   To what extent do you consider your sleep problem to INTERFERE with your daily functioning (e.g. daytime fatigue, mood, ability to function at work/daily chores, concentration, memory, mood, etc.) CURRENTLY? 0   BREA Total Score 3        Patient-reported         Guidelines for Scoring/Interpretation:  Total score categories:  0-7 = No clinically significant insomnia   8-14 = Subthreshold insomnia   15-21 = Clinical insomnia (moderate severity)  22-28 = Clinical insomnia (severe)  Used via courtesy of www.[place].va.gov with permission from Rasta Esparza PhD., [facility]    Problem List:  Patient Active Problem List    Diagnosis Date Noted    YURIY (obstructive sleep apnea) 03/13/2019     Priority: Medium     2/25/2019 [place] Diagnostic Sleep Study (222.0 lbs) - AHI 18.8, RDI 22.6, Supine AHI 23.1, REM AHI 30.5, Low O2  82.5%, Time Spent <=88% 0.4 minutes / Time Spent <=89% 2.5 minutes.            /69   Pulse (!) 47   Ht 1.829 m (6')   Wt 95.3 kg (210 lb)   SpO2 97%   BMI 28.48 kg/m      Impression/Plan:     Moderate sleep apnea.     Patient is using CPAP regularly and is continuing to benefit from treatment. I reviewed download data and graphs from his device for last 30 days. Regular compliance and normal residual AHI is demonstrated, confirming adequate treatment of sleep apnea.     He wants to lowe maximum pressure to 10 cm H2O for comfort and I made the change.     Plan:     Continue auto PAP therapy 5-10 cm H2O    Diony [place] will follow up in about 1 year(s).       Electronically signed by Dr. Levy Coles, Diplomate, Sleep Medicine, ABPN

## 2025-06-04 NOTE — PATIENT INSTRUCTIONS
Your There is no height or weight on file to calculate BMI.  Weight management is a personal decision.  If you are interested in exploring weight loss strategies, the following discussion covers the approaches that may be successful. Body mass index (BMI) is one way to tell whether you are at a healthy weight, overweight, or obese. It measures your weight in relation to your height.  A BMI of 18.5 to 24.9 is in the healthy range. A person with a BMI of 25 to 29.9 is considered overweight, and someone with a BMI of 30 or greater is considered obese. More than two-thirds of American adults are considered overweight or obese.  Being overweight or obese increases the risk for further weight gain. Excess weight may lead to heart disease and diabetes.  Creating and following plans for healthy eating and physical activity may help you improve your health.  Weight control is part of healthy lifestyle and includes exercise, emotional health, and healthy eating habits. Careful eating habits lifelong are the mainstay of weight control. Though there are significant health benefits from weight loss, long-term weight loss with diet alone may be very difficult to achieve- studies show long-term success with dietary management in less than 10% of people. Attaining a healthy weight may be especially difficult to achieve in those with severe obesity. In some cases, medications, devices and surgical management might be considered.  What can you do?  If you are overweight or obese and are interested in methods for weight loss, you should discuss this with your provider.   Consider reducing daily calorie intake by 500 calories.   Keep a food journal.   Avoiding skipping meals, consider cutting portions instead.    Diet combined with exercise helps maintain muscle while optimizing fat loss. Strength training is particularly important for building and maintaining muscle mass. Exercise helps reduce stress, increase energy, and improves  fitness. Increasing exercise without diet control, however, may not burn enough calories to loose weight.     Start walking three days a week 10-20 minutes at a time  Work towards walking thirty minutes five days a week   Eventually, increase the speed of your walking for 1-2 minutes at time    In addition, we recommend that you review healthy lifestyles and methods for weight loss available through the National Institutes of Health patient information sites:  http://win.niddk.nih.gov/publications/index.htm    And look into health and wellness programs that may be available through your health insurance provider, employer, local community center, or [employer].

## 2025-07-12 ENCOUNTER — HEALTH MAINTENANCE LETTER (OUTPATIENT)
Age: 70
End: 2025-07-12